# Patient Record
Sex: MALE | Race: WHITE
[De-identification: names, ages, dates, MRNs, and addresses within clinical notes are randomized per-mention and may not be internally consistent; named-entity substitution may affect disease eponyms.]

---

## 2019-07-05 ENCOUNTER — HOSPITAL ENCOUNTER (EMERGENCY)
Dept: HOSPITAL 25 - UCCORT | Age: 24
Discharge: HOME | End: 2019-07-05
Payer: COMMERCIAL

## 2019-07-05 VITALS — SYSTOLIC BLOOD PRESSURE: 118 MMHG | DIASTOLIC BLOOD PRESSURE: 66 MMHG

## 2019-07-05 DIAGNOSIS — R50.9: Primary | ICD-10-CM

## 2019-07-05 LAB
FLUAV RNA SPEC QL NAA+PROBE: NEGATIVE
FLUBV RNA SPEC QL NAA+PROBE: NEGATIVE

## 2019-07-05 PROCEDURE — 96361 HYDRATE IV INFUSION ADD-ON: CPT

## 2019-07-05 PROCEDURE — 71046 X-RAY EXAM CHEST 2 VIEWS: CPT

## 2019-07-05 PROCEDURE — 96374 THER/PROPH/DIAG INJ IV PUSH: CPT

## 2019-07-05 PROCEDURE — 96360 HYDRATION IV INFUSION INIT: CPT

## 2019-07-05 PROCEDURE — G0463 HOSPITAL OUTPT CLINIC VISIT: HCPCS

## 2019-07-05 PROCEDURE — 81003 URINALYSIS AUTO W/O SCOPE: CPT

## 2019-07-05 PROCEDURE — 96375 TX/PRO/DX INJ NEW DRUG ADDON: CPT

## 2019-07-05 PROCEDURE — 99202 OFFICE O/P NEW SF 15 MIN: CPT

## 2019-07-05 NOTE — UC
FLU HPI





- HPI Summary


HPI Summary: 


24-year-old male presents with mother stating onset of flulike illness 6 days 

ago including fever, chills, fatigue, myalgias, and occasionally productive 

cough.  States after 2 days was feeling better and return to normal activities 

however 2 days ago symptoms returned and were more severe, he has developed a 

generalized headache, shortness of breath, dizziness, nausea and vomiting.  

Complains of chest wall and abdominal pain with coughing.  Reports drinking 

fluids well however unable to tolerate solid foods.  Having 3-4 episodes of 

vomiting daily.  Denies ear pain, nasal congestion, sore throat, neck pain, 

photophobia, visual disturbances, diarrhea, dysuria, frequency, urgency, or 

hematuria.








- History of Current Complaint


Chief Complaint: UCGeneralIllness


Stated Complaint: FLU LIKE SYMPTOMS


Time Seen by Provider: 07/05/19 12:34


Hx Obtained From: Patient


Pain Intensity: 8





- Allergy/Home Medications


Allergies/Adverse Reactions: 


 Allergies











Allergy/AdvReac Type Severity Reaction Status Date / Time


 


pollen extracts Allergy  Difficulty Verified 07/05/19 12:42





   Breathing  














PMH/Surg Hx/FS Hx/Imm Hx


Previously Healthy: Yes - Denies significant PMH





- Surgical History


Surgical History: Yes


Surgery Procedure, Year, and Place: Tonsils





- Family History


Known Family History: Positive: Non-Contributory





- Social History


Occupation: Employed Full-time


Lives: Alone


Alcohol Use: None


Substance Use Type: Marijuana


Substance Use Comment - Amount & Last Used: occasionally


Smoking Status (MU): Never Smoked Tobacco





Review of Systems


All Other Systems Reviewed And Are Negative: Yes


Constitutional: Positive: Fever, Chills, Fatigue


Skin: Negative: Rash


Eyes: Negative: Drainage, Eye Redness


ENT: Positive: Sore Throat, Sinus Congestion.  Negative: Ear Ache, Nasal 

Discharge, Sinus Pain/Tenderness


Respiratory: Positive: Shortness Of Breath, Cough


Cardiovascular: Positive: Other - Chest wall pain with cough.  Negative: 

Palpitations


Gastrointestinal: Positive: Abdominal Pain - With cough, Vomiting, Nausea.  

Negative: Diarrhea


Genitourinary: Negative: Dysuria, Hematuria, Frequency, Urgency


Musculoskeletal: Positive: Myalgia


Neurological: Positive: Headache, Other - Dizziness


Is Patient Immunocompromised?: No





Physical Exam





- Summary


Physical Exam Summary: 


GENERAL APPEARANCE: Thin, alert and cooperative, young adult male who appears 

ill but non-toxic.





HEAD: Atraumatic. Normocephalic.





EYES: Conjunctiva clear. No drainage.





EARS: External auditory canals and tympanic membranes clear, hearing grossly 

intact.





NOSE: No nasal discharge.





THROAT: Pharynx normal. Tonsils surgically absent. Uvula midline. Oral cavity 

normal. Teeth and gingiva in good general condition.





NECK: Neck supple, non-tender without lymphadenopathy. No nuchal rigidity.





CARDIAC: Normal S1 and S2. No S3, S4 or murmurs. Tachycardic. Rhythm is 

regular. There is no peripheral edema, cyanosis or pallor. Extremities are warm 

and well perfused. Capillary refill is less than 2 seconds. Peripheral pulses 

intact.





LUNGS: Tachypnic. Clear to auscultation without rales, rhonchi, wheezing or 

diminished breath sounds.





ABDOMEN: Positive bowel sounds. Soft, nondistended. Generalized tenderness 

without guarding or rebound. No masses or hepatosplenomegally. No CVA 

tenderness.





MUSKULOSKELETAL: ROM intact to all extremities. No joint erythema or 

tenderness. Normal muscular development. Normal gait.





BACK: Examination of the spine reveals normal gait and posture, no spinal 

deformity or tenderness, decreased range of motion or muscular spasm.





EXTREMITIES: No significant deformity or joint abnormality. No edema. 





NEUROLOGICAL: CN II-XII intact. Strength and sensation symmetric and intact 

throughout. Reflexes 2+ throughout.





SKIN: Skin normal color, texture and turgor with no lesions or eruptions.





Triage Information Reviewed: Yes


Vital Signs: 


 Initial Vital Signs











Temp  101.0 F   07/05/19 12:34


 


Pulse  123   07/05/19 12:34


 


Resp  24   07/05/19 12:34


 


BP  108/67   07/05/19 12:34


 


Pulse Ox  96   07/05/19 12:34











Vital Signs Reviewed: Yes





Diagnostics





- Radiology


  ** No standard instances


Radiology Interpretation Completed By: Radiologist


Summary of Radiographic Findings: Order Information: CHEST PA LAT 2 VWS.  

Accession Number: [id number].  CPT: 06373.  INDICATION: Cough and fever for 

one week. Shortness of breath.  COMPARISON: No relevant prior exams available 

on the [facility] PACS for comparison.  TECHNIQUE: Dual energy PA and routine lateral 

views of the chest were obtained.  REPORT: Clear lungs and pleural spaces. 

Negative for pneumothorax. The heart, pulmonary vasculature, and mediastinal 

contours are unremarkable. Unremarkable osseous structures and soft tissue 

contours.  IMPRESSION:  #. No evidence for pneumonia. Negative exam.





Re-Evaluation





- Re-Evaluation


  ** First Eval


Re-Evaluation Time: 14:50


Change: Improved


Comment: Patient states feeling better. His chest pain, abdominal pain, and 

body aches have subsided. Denies SOB. Bilateral breath sounds clear. No nausea. 

Vital signs have normalized except for pulse ox that is 93-94%. They do improve 

to 97-98% when encouraged to deep breath.





Flu Course/Dx





- Course


Course Of Treatment: 


24-year-old male presents with mother stating onset of flulike illness 6 days 

ago including fever, chills, fatigue, myalgias, and occasionally productive 

cough.  States after 2 days was feeling better and return to normal activities 

however 2 days ago symptoms returned and were more severe, he has developed a 

generalized headache, shortness of breath, dizziness, nausea and vomiting.  

Complains of chest wall and abdominal pain with coughing.  Reports drinking 

fluids well however unable to tolerate solid foods.  Having 3-4 episodes of 

vomiting daily.  Denies ear pain, nasal congestion, sore throat, neck pain, 

photophobia, visual disturbances, diarrhea, dysuria, frequency, urgency, or 

hematuria.  Patient had an elevated temperature of 101.2 F. Mildly tachycardic 

and tachypnic otherwise vital signs stable.  Patient had supple nontender neck 

with no nuchal rigidity, no nasal congestion or discharge, pharynx normal with 

surgically absent tonsils, no cervical lymphadenopathy, tachycardia with 

regular rate and rhythm, clear bilateral breath sounds, generalized abdominal 

tenderness without guarding or rebound, and otherwise unremarkable exam.  Rapid 

flu test was negative.  Chest x-ray negative.  Fingerstick glucose was 101 mg/

dL. point-of-care urinalysis showed 2+ ketones, 1+ protein, 1+ bilirubin.  

Patient received 2 L of normal saline, ondansetron 8 mg IV, ketorolac 15 mg IV, 

and acetaminophen 650 mg PO with improvement in symptoms. Vital signs 

stabilized. His pulse ox was 93-94% however improve to 97-98% when encouraged 

to deep breath. Reviewed all results with the patient and mother.  Patient 

states he feels well enough to return home and continue symptomatic care.  

Based on his history of symptoms improvement and then decline, is fever, 

complaints of shortness of breath, and cough I'm concerned he may have an early 

pneumonia despite his x-ray results and will start him on a course of 

azithromycin.  I'm also going to provide him with some ondansetron ODT 4 mg 

every 6 hours as needed for nausea or vomiting.  Patient is to follow-up at the 

[facility] in 3 days for reevaluation.  He was instructed 

to call this afternoon for an appointment.  Anticipatory guidance and warning 

symptoms requiring immediate evaluation in the emergency room were reviewed 

with the patient.  Verbalizes understanding and agrees with plan of care.








- Differential Dx/Diagnosis


Differential Diagnosis/HQI/PQRI: Bronchitis, Influenza, Pneumonia, Other - 

menigitis, gastroenteritis, viral syndrome


Provider Diagnosis: 


 Acute febrile illness








Discharge





- Sign-Out/Discharge


Documenting (check all that apply): Patient Departure


All imaging exams completed and their final reports reviewed: Yes





- Discharge Plan


Condition: Stable


Disposition: HOME


Prescriptions: 


Azithromyxin ANDREY (NF) [Z-Andrey (Zithromax) 250 mg tabs #6] 2 tab PO .TODAY, THEN 

1 DAILY #6 tab


Ondansetron [Ondansetron Odt] 4 mg PO Q6HR PRN #12 tab.rapdis


 PRN Reason: Nausea/Vomiting


Patient Education Materials:  Fever in Adults (ED)


Forms:  *Work Release


Referrals: 


No Primary Care Phys,NOPCP [Primary Care Provider] - 


[facility] [Outside] - 3 Days (Call for appointment.)


Additional Instructions: 


The rapid flu test performed in the clinic today was negative. The x-ray showed 

no evidence of pneumonia. The urine test also showed no evidence of an 

infection.





Although the x-ray was normal, I am concerned that with the worsening of your 

symptoms you may have an early pneumonia and am going to treat you with an 

antibiotic. 





Start azithromycin 2 tablets today then 1 tablet a day for the next 4 days.





Drink plenty of fluids to avoid dehydration especially if you are running any 

fever.





Take over the counter acetaminophen (Tylenol) or ibuprofen (Advil, Motrin) 

according to directions as needed for pain or fever. Your last dose was at 1:50 

pm.





Take ondansetron 4 mg every 6 hours as needed for nausea or vomiting. You last 

dose was at 1:40 pm.





Follow up with [facility] in 3 days for 

recheck of symptoms. Call today for an appointment.





Seek immediate medical attention in the emergency room if you have persistent 

fever greater than 100.5 F despite taking acetaminophen or ibuprofen, severe 

headache, neck pain/stiffness, have chest pain, difficulty breathing, severe 

abdominal pain, persistent vomiting, or have any worsening of symptoms.





- Billing Disposition and Condition


Condition: STABLE


Disposition: Home